# Patient Record
Sex: MALE | Race: WHITE | URBAN - METROPOLITAN AREA
[De-identification: names, ages, dates, MRNs, and addresses within clinical notes are randomized per-mention and may not be internally consistent; named-entity substitution may affect disease eponyms.]

---
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
## 2022-04-30 NOTE — ED PROVIDER NOTES
"   Patient:   Porsha Jones            MRN: [id number]            FIN: [id number]               Age:   17 years     Sex:  Male     :  2000   Associated Diagnoses:   Dehydration; Hyponatremia; Acute hypokalemia   Author:   Mona Baker MD      Basic Information   Time seen: Date & time 2018 11:30:00.   History source: Patient.   Arrival mode: Private vehicle.   Additional information: Chief Complaint from Nursing Triage Note : Chief Complaint   2018 11:02 CDT      Chief Complaint           states "has every symptoms of hyperkalemia" reports ate 4tbsp of cream of [place] 1 week ago and has been feeling bad ever since.  .      History of Present Illness   The patient presents with "feeling sick" and Nausea and vomiting, after drinking 4 tablespoon full of cream of tartar to get the marijuana out of the system, so that he can get a job.  The onset was 7  days ago.  The course/duration of symptoms is constant.  Location: generalized. The character of symptoms is Nausea and vomiting.  The degree at onset was moderate.  The degree at present is moderate.  Risk factors consist of Drug abuse.  Therapy today: none.  Associated symptoms: nausea, vomiting and Degrees appetite.        Review of Systems   Constitutional symptoms:  No fever, no chills, no sweats.    Skin symptoms:  No jaundice, no rash.    Eye symptoms:  Negative except as documented in HPI.   ENMT symptoms:  No sore throat,    Respiratory symptoms:  No shortness of breath, no cough, no wheezing.    Cardiovascular symptoms:  No chest pain, no palpitations, no tachycardia.    Gastrointestinal symptoms:  Nausea, vomiting, no abdominal pain, no diarrhea, no constipation.    Genitourinary symptoms:  No dysuria,    Musculoskeletal symptoms:  No back pain,    Neurologic symptoms:  No headache, no dizziness.    Psychiatric symptoms:  No anxiety, no depression, no substance abuse.    Allergy/immunologic symptoms:  No seasonal allergies,       "        Additional review of systems information: All other systems reviewed and otherwise negative.      Health Status   Allergies:    Allergic Reactions (Selected)  No Known Medication Allergies,    Allergies (1) Active Reaction  No Known Medication Allergies None Documented  .   Medications: Per nurse's notes.      Past Medical/ Family/ Social History   Medical history:    Active  Anxiety disorder (SNOMED CT [id number]), Reviewed as documented in chart.   Surgical history:    No active procedure history items have been selected or recorded., Reviewed as documented in chart.   Family history:    Anxiety disorder  Father  Cerebral palsy (CP)  Mother  , Reviewed as documented in chart.   Social history:    Social & Psychosocial Habits    Alcohol  05/21/2018  Use: Never    Substance Abuse  05/21/2018  Use: Current    Type: Marijuana    Tobacco  05/21/2018  Use: Current every day smoker  , Reviewed as documented in chart.   Problem list:    Active Problems (2)  Anxiety disorder   Tobacco user   , per nurse's notes.      Physical Examination               Vital Signs   Vital Signs   5/21/2018 11:02 CDT      Temperature Oral          36.7 DegC                             Temperature Oral (calculated)             98.06 DegF                             Peripheral Pulse Rate     70 bpm                             Respiratory Rate          18 br/min                             SpO2                      96 %                             Oxygen Therapy            Room air                             Systolic Blood Pressure   127 mmHg                             Diastolic Blood Pressure  85 mmHg  .   Measurements   5/21/2018 11:02 CDT      Weight Dosing             52 kg                             Weight Measured and Calculated in Lbs     114.64 lb                             Weight Estimated          52 kg                             Height/Length Dosing      160 cm                             Height/Length Estimated   160 cm                              Body Mass Index Estimated 20.31 kg/m2                             Body Mass Index Percentile                23.16  .   Oxygen saturation.   General:  Alert, no acute distress.    Skin:  Normal for ethnicity.   Head:  Normocephalic.   Neck:  Supple.   Eye:  Extraocular movements are intact, No icterus.    Ears, nose, mouth and throat:  Oral mucosa moist, no pharyngeal erythema or exudate.    Cardiovascular:  Regular rate and rhythm, No murmur, Normal peripheral perfusion, No edema.    Respiratory:  Lungs are clear to auscultation, respirations are non-labored, breath sounds are equal.    Back:  Nontender, Normal range of motion.    Musculoskeletal:  Normal ROM.   Chest wall   Gastrointestinal:  Soft, Nontender, Non distended, Normal bowel sounds.    Neurological:  Alert and oriented to person, place, time, and situation, normal motor observed, normal speech observed.    Lymphatics   Psychiatric:  Cooperative, appropriate mood & affect.       Medical Decision Making   Documents reviewed:  Emergency department nurses' notes.   Orders  Launch Order Profile (Selected)   Inpatient Orders  InProcess (In Process)  Drug Screen Urine [facility]: Stat collect, Urine, 05/21/18 11:40:00 CDT, Stop date 05/21/18 11:40:00 CDT, Nurse collect, Print Label By Order Location  UA Only Microscopic: Stat collect, Urine, Collected, 05/21/18 11:57:00 CDT, Stop date 05/21/18 11:57:00 CDT, Nurse collect, Print Label By Order Location, 05/21/18 11:40:00 CDT  Ordered  IVF Normal Saline NS Infusion: 1,000 mL, 1,000 mL, IV, 1000 mL/hr, start date 05/21/18 11:40:00 CDT, stop date 05/21/18 11:40:00 CDT  Zofran 2 mg/mL injectable solution: 4 mg, form: Injection, IV Push, Once-NOW, first dose 05/21/18 11:40:00 CDT, stop date 05/21/18 11:40:00 CDT  Ordered (Exam Started)  XR Chest 2 Views: Stat, 05/21/18 11:56:00 CDT, Chest Pain, None, Wheelchair, Rad Type, 05/21/18 11:56:00 CDT  Ordered (In-Lab)  CMP: Stat collect, 05/21/18  11:40:00 CDT, Blood, Once, Stop date 05/21/18 11:40:00 CDT, Lab Collect, Print Label By Order Location, 05/21/18 11:40:00 CDT  Completed  Automated Diff: Now collect, 05/21/18 11:45:00 CDT, Blood, Collected, Stop date 05/21/18 11:45:00 CDT, Lab Collect, Print Label By Order Location, 05/21/18 11:40:00 CDT  CBC w/ Auto Diff: Now collect, 05/21/18 11:40:00 CDT, Blood, Stop date 05/21/18 11:40:00 CDT, Lab Collect, Print Label By Order Location, 05/21/18 11:40:00 CDT  EKG Adult 12 Lead: 05/21/18 11:40:00 CDT, Stat, Once-NOW, 05/21/18 11:40:00 CDT, -1, -1, 05/21/18 11:40:00 CDT  Saline Lock Insert: 05/21/18 11:40:00 CDT, Once-NOW, Stop date 05/21/18 11:40:00 CDT  Urinalysis with Microscopic if Indicated: Stat collect, Urine, 05/21/18 11:40:00 CDT, Stop date 05/21/18 11:40:00 CDT, Nurse collect, Print Label By Order Location, 05/21/18 11:40:00 CDT  .   Results review:  Lab results : Lab View   5/21/2018 11:57 CDT      U pH                      6.0                             UA Appear                 CLEAR                             UA Color                  YELLOW                             UA Spec Grav              1.020  NA                             UA Bili                   SMALL                             UA pH                     6.0  NA                             UA Urobilinogen           4.0 EU/dL                             UA Blood                  SMALL                             UA Glucose                Negative                             UA Ketones                15 mg/dL                             UA Protein                Trace                             UA Nitrite                Negative                             UA Leuk Est               Negative                             UA WBC                    0-2 /HPF                             UA RBC                    Rare /HPF                             UA Bacteria               Rare /HPF                             UA Renal Epi              Few  /LPF                             U Temp                    25.0 DegC                             U Amph Scr                NEG.                             U Alyse Scr                NEG.                             U Benzodia Scr            NEG.                             U Cannab Scr              POS.                             U Cocaine Scr             NEG.                             U Opiate Scr              NEG.                             U Phencyclidine Scr       NEG.                             U Methadone               NEG.                             U MDMA Scr                NEG.    5/21/2018 11:45 CDT      Sodium Lvl                129 mmol/L  LOW                             Potassium Lvl             3.2 mmol/L  LOW                             Chloride                  84 mmol/L  LOW                             CO2                       36 mmol/L  HI                             Calcium Lvl               10.0 mg/dL                             Glucose Lvl               113 mg/dL                             BUN                       32 mg/dL  HI                             Creatinine                0.99 mg/dL                             eGFR-AA                   >60 mL/min/1.73 m2  NA                             eGFR-CHAYA                  >60 mL/min/1.73 m2  NA                             Bili Total                2.3 mg/dL  HI                             Bili Direct               0.40 mg/dL  HI                             Bili Indirect             1.90 mg/dL  NA                             AST                       19 unit/L                             ALT                       27 unit/L                             Alk Phos                  97 unit/L                             Total Protein             8.7 gm/dL  HI                             Albumin Lvl               4.8 gm/dL                             Globulin                  3.90 gm/dL  NA                             A/G Ratio                 1.2  ratio  NA                             WBC                       7.2 x10(3)/mcL                             RBC                       6.36 x10(6)/mcL  HI                             Hgb                       19.0 gm/dL  HI                             Hct                       51.5 %                             Platelet                  208 x10(3)/mcL                             MCV                       81 fL                             MCH                       30 pg                             MCHC                      37 gm/dL  HI                             RDW                       11.8 %                             MPV                       10.7 fL  HI                             Abs Neut                  5.3 x10(3)/mcL                             Neutro Auto               74 %                             Lymph Auto                15.20 %  LOW                             Mono Auto                 10 %                             Eos Auto                  0 %                             Abs Eos                   0.0 x10(3)/mcL                             Basophil Auto             0 %                             Abs Neutro                5.3 x10(3)/mcL                             Abs Lymph                 1.1 x10(3)/mcL                             Abs Mono                  0.7 x10(3)/mcL                             Abs Baso                  0.0 x10(3)/mcL    ,    Labs (Last four charted values)  WBC                  7.2 (MAY 21)   Hgb                  H 19.0 (MAY 21)   Hct                  51.5 (MAY 21)   Plt                  208 (MAY 21) .       Reexamination/ Reevaluation   Time: 5/21/2018 12:37:00  5/21/2018 12:34:00  .   Vital signs   results included from flowsheet : Vital Signs   5/21/2018 12:37 CDT      Temperature Temporal Artery               36.8 DegC                             Peripheral Pulse Rate     50 bpm  LOW                             Respiratory Rate          18 br/min                              SpO2                      98 %                             Oxygen Therapy            Room air                             Systolic Blood Pressure   121 mmHg                             Diastolic Blood Pressure  88 mmHg                             Mean Arterial Pressure, Cuff              99 mmHg     Course: improving.   Notes: Patient's condition is unchanged, and his vomiting has decreased    Vital signs see above  S1, S2 is audible, no S3 no S4, no murmurs.  Chest clear to auscultation  Abdomen is soft, nondistended, nontender, bowel sounds are audible.  Mental status unchanged  Capillary refill normal  Peripheral pulses Radial are palpable b/L.    Patient is getting normal saline bolus 1 L, after that, I will put him on KCl 40 mEq in 1 L of normal saline going at 175 mL per hour to replace his potassium and sodium, I will also leave him on Zofran when necessary, once he starts tolerating the food he can be discharged home.      .      Impression and Plan   Diagnosis   Dehydration (OVG25-PC E86.0)   Hyponatremia (IYO97-OT E87.1)   Acute hypokalemia (AQV12-AZ E87.6)      Calls-Consults   -  5/21/2018 12:33:00 , Ozzie MONTES, [facility], recommends Okay to admit.    Plan   Condition: Stable.    Disposition: Admit time  5/21/2018 12:34:00, Place in Observation Unit.    Orders: Launch Orders   Admit/Transfer/Discharge:  Admit to Outpatient Observation (Order): 5/21/2018 12:33 CDT, Ozzie MONTES, [facility], No, Dehydration, hypokalemia, hyponatremia  .

## 2022-04-30 NOTE — DISCHARGE SUMMARY
Patient:   Porsha Jones            MRN: [id number]            FIN: [id number]               Age:   17 years     Sex:  Male     :  2000   Associated Diagnoses:   Dehydration; Acute hypokalemia; Hyponatremia; Tobacco user; Anxiety disorder; Nausea & vomiting   Author:   Torres Horne MD      Physical Examination   General:  Alert and oriented, No acute distress.         Appearance: Well nourished.         Behavior: Appropriate.         Skin: Normal for ethnicity.    Eye:  Pupils are equal, round and reactive to light, Extraocular movements are intact.    HENT:  Normocephalic, Normal hearing, Oral mucosa is moist, No pharyngeal erythema.    Neck:  Supple, Non-tender, No carotid bruit, No jugular venous distention, No lymphadenopathy, No thyromegaly.    Respiratory:  Lungs are clear to auscultation, Breath sounds are equal, No chest wall tenderness.    Cardiovascular:  Normal rate, Regular rhythm, No murmur, No gallop, Good pulses equal in all extremities, Normal peripheral perfusion, No edema.    Gastrointestinal:  Soft, Non-tender, Non-distended, Normal bowel sounds, No organomegaly.    Genitourinary:  No costovertebral angle tenderness, No urethral discharge.    Vital Signs   2018 11:00 CDT      Temperature Axillary      36.6 DegC                             Temperature Axillary (calculated)         97.88 DegF                             Heart Rate Monitored      57 bpm  LOW                             Respiratory Rate          18 br/min                             SpO2                      98 %                             Systolic Blood Pressure   109 mmHg                             Diastolic Blood Pressure  74 mmHg    2018 7:00 CDT       Temperature Axillary      36.6 DegC                             Temperature Axillary (calculated)         97.88 DegF                             Heart Rate Monitored      62 bpm                             Respiratory Rate          18 br/min                              SpO2                      100 %                             Systolic Blood Pressure   104 mmHg                             Diastolic Blood Pressure  68 mmHg    5/22/2018 4:00 CDT       Temperature Oral          36.8 DegC                             Temperature Oral (calculated)             98.24 DegF                             Peripheral Pulse Rate     45 bpm  LOW                             Respiratory Rate          18 br/min                             SpO2                      98 %                             Systolic Blood Pressure   107 mmHg                             Diastolic Blood Pressure  58 mmHg  LOW    5/21/2018 23:00 CDT      Temperature Axillary      36.5 DegC                             Temperature Axillary (calculated)         97.70 DegF                             Peripheral Pulse Rate     51 bpm  LOW                             Respiratory Rate          18 br/min                             SpO2                      97 %                             Systolic Blood Pressure   106 mmHg                             Diastolic Blood Pressure  51 mmHg  LOW    5/21/2018 21:00 CDT      Peripheral Pulse Rate     52 bpm  LOW                             Systolic Blood Pressure   110 mmHg  (Modified)                            Diastolic Blood Pressure  70 mmHg  (Modified)   5/21/2018 20:00 CDT      Temperature Oral          36.8 DegC                             Temperature Oral (calculated)             98.24 DegF                             Peripheral Pulse Rate     51 bpm  LOW                             Respiratory Rate          18 br/min                             SpO2                      97 %                             Systolic Blood Pressure   98 mmHg                             Diastolic Blood Pressure  59 mmHg  LOW    5/21/2018 15:00 CDT      Temperature Oral          36.8 DegC                             Temperature Oral (calculated)             98.24 DegF                              Heart Rate Monitored      49 bpm  LOW                             Respiratory Rate          18 br/min                             SpO2                      99 %                             Systolic Blood Pressure   126 mmHg                             Diastolic Blood Pressure  78 mmHg    5/21/2018 12:37 CDT      Temperature Oral          36.8 DegC                             Temperature Oral (calculated)             98.24 DegF                             Temperature Temporal Artery               36.8 DegC                             Peripheral Pulse Rate     50 bpm  LOW                             Heart Rate Monitored      52 bpm  LOW                             Respiratory Rate          18 br/min                             SpO2                      98 %                             SpO2                      95 %                             Oxygen Therapy            Room air                             Oxygen Therapy            Room air                             Systolic Blood Pressure   121 mmHg                             Systolic Blood Pressure   130 mmHg                             Diastolic Blood Pressure  88 mmHg                             Diastolic Blood Pressure  83 mmHg                             Mean Arterial Pressure, Cuff              99 mmHg  (Modified)                            Mean Arterial Pressure, Cuff              99 mmHg                             Blood Pressure Location   Right arm    5/21/2018 11:02 CDT      Temperature Oral          36.7 DegC                             Temperature Oral (calculated)             98.06 DegF                             Peripheral Pulse Rate     70 bpm                             Respiratory Rate          18 br/min                             SpO2                      96 %                             Oxygen Therapy            Room air                             Systolic Blood Pressure   127 mmHg                             Diastolic Blood  Pressure  85 mmHg                             Mean Arterial Pressure, Cuff              99 mmHg        Vital Signs (last 24 hrs)_____  Last Charted___________  Temp Axillary     36.6 DegC  (MAY 22 11:00)  Heart Rate Peripheral   L 45bpm  (MAY 22 04:00)  Resp Rate         18 br/min  (MAY 22 11:00)  SBP      109 mmHg  (MAY 22 11:00)  DBP      74 mmHg  (MAY 22 11:00)  SpO2      98 %  (MAY 22 11:00)     Measurements from flowsheet : Measurements   5/21/2018 12:37 CDT      Weight Dosing             52 kg                             Weight Measured and Calculated in Lbs     114.64 lb                             Height/Length Dosing      160 cm    5/21/2018 11:02 CDT      Weight Dosing             52 kg                             Weight Measured and Calculated in Lbs     114.64 lb                             Weight Estimated          52 kg                             Height/Length Dosing      160 cm                             Height/Length Estimated   160 cm                             Body Mass Index Estimated 20.31 kg/m2                             Body Mass Index Percentile                23.16     Lymphatics:  No lymphadenopathy neck, axilla, groin.    Musculoskeletal:  Normal range of motion, Normal strength, No tenderness, No swelling, Normal gait.    Integumentary:  Warm, Dry, Pink, Intact, No rash.    Neurologic:  Alert, Oriented, Normal sensory, Normal motor function, No focal deficits, Cranial Nerves II-XII are grossly intact.    Psychiatric:  Cooperative, Appropriate mood & affect, Normal judgment.       Hospital Course   18yo mal admitted for dehydration due to intractable N/V for the last 6-7 days.  Apparently he took 4 tbsp of cream of tartar in order to pass a drug screen for THC.  He started vomiting shortly after taking the tartar.  he was admitted and placed on IV fluids.  His condition resolved quickly and today he is stable for discharge home.    D/C=33mins      Discharge Plan   Discharge Summary Plan    Discharge Status: stable.     Discharge instructions given: to patient.     Discharge disposition: discharge to home self care.     Prescriptions: continue same medications.     Diagnosis     Dehydration (LXF16-HW E86.0).     Acute hypokalemia (FWR37-QA E87.6).     Hyponatremia (KQT26-YC E87.1).     Tobacco user (YNC45-YP Z72.0).     Anxiety disorder (CCQ21-YE F41.9).     Nausea & vomiting (YJM79-AZ R11.2).     Course   Improving.     Education and Follow-up   Counseled: patient, regarding diagnosis, regarding treatment, regarding medications.

## 2022-04-30 NOTE — H&P
"   Patient:   Porsha Jones            MRN: [id number]            FIN: [id number]               Age:   17 years     Sex:  Male     :  2000   Associated Diagnoses:   Dehydration; Acute hypokalemia; Hyponatremia; Tobacco user; Anxiety disorder; Nausea & vomiting   Author:   Torres Horne MD      Basic Information   Source of history:  Self.    Referral source:  Emergency department.    History limitation:  None.       Chief Complaint   2018 11:02 CDT      states "has every symptoms of hyperkalemia" reports ate 4tbsp of cream of [place] 1 week ago and has been feeling bad ever since.        History of Present Illness   18yo male presents to the ED today with a 5-6 day h/o N/V with abdominal pain.  He states that he took 4 tbsp of [place] of [place] a week ago in order to pass a drug screen so he could get a job.  He states that ever since he took this he has been sick to his stomach and not able to keep anything down.  No diarrhea.  His abdominal pain is a cramping sensation rated at 6/10.  He denies fever/chills.  No sick contacts or recent travels.  He will be admitted for IV fluids and antiemetics.  He also has not had a BM in 7 days.      Review of Systems   Constitutional:  Weakness, Fatigue, No fever, No chills, No sweats.    Eye:  No double vision, No dry eyes, No photophobia.    Ear/Nose/Mouth/Throat:  No ear pain, No nasal congestion, No sore throat.    Respiratory:  No shortness of breath, No cough, No sputum production, No hemoptysis, No wheezing, No pleuritic pain.    Cardiovascular:  No chest pain, No palpitations, No peripheral edema, No syncope.    Gastrointestinal:  Nausea, Vomiting, Constipation, No diarrhea, No heartburn.         Abdominal pain: All quadrants, The severity is moderate, Characterized as ( Cramping/colicky ).    Genitourinary:  No dysuria, No hematuria, No change in urine stream.    Hematology/Lymphatics:  No anemia, No bruising tendency, No bruise, No " hemorrhage, No petechiae.    Endocrine:  No excessive thirst, No polyuria, No cold intolerance.    Immunologic:  No recurrent fevers, No recurrent infections.    Musculoskeletal:  No back pain, No joint pain, No muscle pain, No decreased range of motion.    Integumentary:  No rash, No pruritus, No petechiae, No skin lesion.    Neurologic:  Alert and oriented X4, No abnormal balance, No confusion, No tingling.    Psychiatric:  No anxiety, No depression.       Health Status   Allergies:    Allergies (1) Active Reaction  No Known Medication Allergies None Documented     Current medications:    Medications (5) Active  Scheduled: (2)  enoxaparin 40mg/0.4ml Inj  40 mg 0.4 mL, Subcutaneous, Daily  polyethylene glycol (Miralax 17 gm pkt)  17 gm 1 packet(s), Oral, Daily  Continuous: (2)  NS 0.9% with KCl 40 mEq/L 1,000 mL  1,000 mL, IV, 175 mL/hr  sodium chloride 0.9% 1,000 mL  1,000 mL, IV, 150 mL/hr  PRN: (1)  ondansetron 2 mg/mL inj - 2mL  4 mg 2 mL, IV Push, q4hr     Problem list:    Active Problems (2)  Anxiety disorder   Tobacco user         Histories   Past Medical History:    Active  Anxiety disorder ([id number])   Family History:    Anxiety disorder  Father  Cerebral palsy (CP)  Mother     Procedure history:    No active procedure history items have been selected or recorded., NO h/O surgery   Social History        Social & Psychosocial Habits    Alcohol  05/21/2018  Use: Never    Exercise  05/21/2018  Times per week: 3-4 times/week    Self assessment: Good condition    Exercise type: Walking    Home/Environment  05/21/2018  Lives with: Mother    Living situation: Home/Independent    Substance Abuse  05/21/2018  Use: Past    Type: Marijuana    Tobacco  05/21/2018  Use: Former smoker    Type: Cigarettes    Started at age: 13 Years    Stopped at age: 17 Years  .        Physical Examination   General:  Alert and oriented, Mild distress.         Appearance: Well nourished.         Behavior: Appropriate.         Skin:  Normal for ethnicity.    Eye:  Pupils are equal, round and reactive to light, Extraocular movements are intact.    HENT:  Normocephalic, Normal hearing, Oral mucosa is moist, No pharyngeal erythema, dry mucous membranes.    Neck:  Supple, Non-tender, No carotid bruit, No jugular venous distention, No lymphadenopathy, No thyromegaly.    Respiratory:  Lungs are clear to auscultation, Breath sounds are equal, No chest wall tenderness.    Cardiovascular:  Normal rate, Regular rhythm, No murmur, No gallop, Good pulses equal in all extremities, Normal peripheral perfusion, No edema.    Gastrointestinal:  Non-distended, Normal bowel sounds, No organomegaly, firm, TTP which diffuse.    Genitourinary:  No costovertebral angle tenderness, No urethral discharge.       Vital Signs (last 24 hrs)_____  Last Charted___________  Temp Oral     36.8 DegC  (MAY 21 12:37)  Heart Rate Peripheral   L 50bpm  (MAY 21 12:37)  Resp Rate         18 br/min  (MAY 21 12:37)  SBP      130 mmHg  (MAY 21 12:37)  DBP      83 mmHg  (MAY 21 12:37)  SpO2      95 %  (MAY 21 12:37)     Measurements from flowsheet : Measurements   5/21/2018 12:37 CDT      Weight Dosing             52 kg                             Weight Measured and Calculated in Lbs     114.64 lb                             Height/Length Dosing      160 cm    5/21/2018 11:02 CDT      Weight Dosing             52 kg                             Weight Measured and Calculated in Lbs     114.64 lb                             Weight Estimated          52 kg                             Height/Length Dosing      160 cm                             Height/Length Estimated   160 cm                             Body Mass Index Estimated 20.31 kg/m2                             Body Mass Index Percentile                23.16     Lymphatics:  No lymphadenopathy neck, axilla, groin.    Musculoskeletal:  Normal range of motion, Normal strength, No tenderness, No swelling, Normal gait.    Integumentary:   Warm, Dry, Pink, Intact, No rash.    Neurologic:  Alert, Oriented, Normal sensory, Normal motor function, No focal deficits, Cranial Nerves II-XII are grossly intact.    Psychiatric:  Cooperative, Appropriate mood & affect, Normal judgment.       Review / Management   Results review:     Labs (Last four charted values)  WBC                  7.2 (MAY 21)   Hgb                  H 19.0 (MAY 21)   Hct                  51.5 (MAY 21)   Plt                  208 (MAY 21)   Na                   L 129 (MAY 21)   K                    L 3.2 (MAY 21)   CO2                  H 36 (MAY 21)   Cl                   L 84 (MAY 21)   Cr                   0.99 (MAY 21)   BUN                  H 32 (MAY 21)   Glucose Random       113 (MAY 21) , All Results   5/21/2018 11:57 CDT      UA Color                  YELLOW                             UA Spec Grav              1.020  NA                             UA Bili                   SMALL                             UA pH                     6.0  NA                             UA Urobilinogen           4.0 EU/dL                             UA Blood                  SMALL                             UA Glucose                Negative                             UA Ketones                15 mg/dL                             UA Protein                Trace                             UA Nitrite                Negative                             UA Leuk Est               Negative                             UA WBC                    0-2 /HPF                             UA RBC                    Rare /HPF                             UA Bacteria               Rare /HPF                             UA Squam Epithelial       Few /LPF                             UA Renal Epi              None seen /LPF  (Modified)                            U Temp                    25.0 DegC                             U pH                      6.0                             U Amph Scr                NEG.                              U Alyse Scr                NEG.                             U Benzodia Scr            NEG.                             U Cannab Scr              POS.                             U Cocaine Scr             NEG.                             U Opiate Scr              NEG.                             U Phencyclidine Scr       NEG.                             U Methadone               NEG.    5/21/2018 11:45 CDT      WBC                       7.2 x10(3)/mcL                             RBC                       6.36 x10(6)/mcL  HI                             Hgb                       19.0 gm/dL  HI                             Hct                       51.5 %                             Platelet                  208 x10(3)/mcL                             MCV                       81 fL                             MCH                       30 pg                             MCHC                      37 gm/dL  HI                             RDW                       11.8 %                             MPV                       10.7 fL  HI                             Abs Neut                  5.3 x10(3)/mcL                             Neutro Auto               74 %                             Lymph Auto                15.20 %  LOW                             Mono Auto                 10 %                             Eos Auto                  0 %                             Abs Eos                   0.0 x10(3)/mcL                             Sodium Lvl                129 mmol/L  LOW                             Potassium Lvl             3.2 mmol/L  LOW                             Chloride                  84 mmol/L  LOW                             CO2                       36 mmol/L  HI                             Calcium Lvl               10.0 mg/dL                             Glucose Lvl               113 mg/dL                             BUN                       32 mg/dL  HI                              Creatinine                0.99 mg/dL                             eGFR-AA                   >60 mL/min/1.73 m2  NA                             eGFR-CHAYA                  >60 mL/min/1.73 m2  NA                             Bili Total                2.3 mg/dL  HI                             Bili Direct               0.40 mg/dL  HI                             Bili Indirect             1.90 mg/dL  NA                             AST                       19 unit/L                             ALT                       27 unit/L                             Alk Phos                  97 unit/L                             Total Protein             8.7 gm/dL  HI                             Albumin Lvl               4.8 gm/dL                             Globulin                  3.90 gm/dL  NA                             A/G Ratio                 1.2 ratio  NA  .    Radiology results   Rad Results (ST)   Accession: [id number]  Order: XR Chest 2 Views  Report Dt/Tm: 05/21/2018 12:34  Report:   CHEST 2 VIEWS :     HISTORY: Chest Pain          COMPARISON: None available     FINDINGS: PA/AP and lateral views reveal the heart to be at the lower  lungs are normal in size. The trachea is essentially midline. No  infiltrate or effusion is seen. The lungs are well-expanded.     IMPRESSION:  1.No active cardiopulmonary disease identified         Condition:  Fair.       Impression and Plan   Diagnosis     Dehydration (XMX17-AK E86.0).     Acute hypokalemia (CDG54-HF E87.6).     Hyponatremia (TIO07-VX E87.1).     Tobacco user (YRY39-QF Z72.0).     Anxiety disorder (ADM09-GX F41.9).     Nausea & vomiting (VNV08-YO R11.2).     Course:  Unchanged.    Orders      (Selected)   Inpatient Orders  Ordered  Clear Liquid Diet: 05/21/18 13:07:00 CDT, Start Meal: Now  Lovenox: 40 mg, form: Injection, Subcutaneous, Daily, first dose 05/22/18 9:00:00 CDT, for all risk catagories  MiraLax (polyethylene glycol 3350): 17 gm, form: Powder-Recon, Oral, Daily,  first dose 05/21/18 14:36:00 CDT  Scd Pump  [id number]:   Sodium Chloride 0.9% intravenous solution 1,000 mL: 1,000 mL, 1,000 mL, IV, 150 mL/hr, start date 05/21/18 13:00:00 CDT  Zofran: 4 mg, form: Injection, IV Push, q4hr PRN for nausea, first dose 05/21/18 13:00:00 CDT, choose first if ordered with other treatments for nausea  Ordered (Scheduled)  CBC w/ Auto Diff: AM Next collect, 05/22/18 5:00:00 CDT, Blood, Once, Stop date 05/22/18 5:00:00 CDT, Lab Collect, in AM, 05/22/18 5:00:00 CDT  CMP: Routine collect, 05/22/18 5:00:00 CDT, Blood, Once, Stop date 05/22/18 5:00:00 CDT, Lab Collect, in AM, 05/22/18 5:00:00 CDT.     KUB now  follow labs  DVT prophylaxis  IV fluids  advised to stop smoking(cessation=5mins).     Course:  Improving.    Education and Follow-up:       Counseled: Patient, Regarding diagnosis, Regarding treatment, Regarding medications.